# Patient Record
Sex: FEMALE | Race: WHITE | NOT HISPANIC OR LATINO | URBAN - METROPOLITAN AREA
[De-identification: names, ages, dates, MRNs, and addresses within clinical notes are randomized per-mention and may not be internally consistent; named-entity substitution may affect disease eponyms.]

---

## 2023-10-03 ENCOUNTER — EMERGENCY (EMERGENCY)
Facility: HOSPITAL | Age: 23
LOS: 1 days | Discharge: HOME | End: 2023-10-03
Attending: EMERGENCY MEDICINE
Payer: COMMERCIAL

## 2023-10-03 VITALS
HEART RATE: 78 BPM | SYSTOLIC BLOOD PRESSURE: 119 MMHG | WEIGHT: 149.03 LBS | OXYGEN SATURATION: 98 % | TEMPERATURE: 99 F | RESPIRATION RATE: 18 BRPM | DIASTOLIC BLOOD PRESSURE: 77 MMHG

## 2023-10-03 PROCEDURE — 90471 IMMUNIZATION ADMIN: CPT

## 2023-10-03 PROCEDURE — 96372 THER/PROPH/DIAG INJ SC/IM: CPT | Mod: XU

## 2023-10-03 PROCEDURE — 99283 EMERGENCY DEPT VISIT LOW MDM: CPT | Mod: 25

## 2023-10-03 PROCEDURE — 12002 RPR S/N/AX/GEN/TRNK2.6-7.5CM: CPT

## 2023-10-03 PROCEDURE — 90715 TDAP VACCINE 7 YRS/> IM: CPT

## 2023-10-03 PROCEDURE — 99284 EMERGENCY DEPT VISIT MOD MDM: CPT | Mod: 25

## 2023-10-03 RX ORDER — KETOROLAC TROMETHAMINE 30 MG/ML
30 SYRINGE (ML) INJECTION ONCE
Refills: 0 | Status: DISCONTINUED | OUTPATIENT
Start: 2023-10-03 | End: 2023-10-03

## 2023-10-03 RX ORDER — LIDOCAINE HYDROCHLORIDE AND EPINEPHRINE 10; 10 MG/ML; UG/ML
10 INJECTION, SOLUTION INFILTRATION; PERINEURAL ONCE
Refills: 0 | Status: DISCONTINUED | OUTPATIENT
Start: 2023-10-03 | End: 2023-10-07

## 2023-10-03 RX ORDER — TETANUS TOXOID, REDUCED DIPHTHERIA TOXOID AND ACELLULAR PERTUSSIS VACCINE, ADSORBED 5; 2.5; 8; 8; 2.5 [IU]/.5ML; [IU]/.5ML; UG/.5ML; UG/.5ML; UG/.5ML
0.5 SUSPENSION INTRAMUSCULAR ONCE
Refills: 0 | Status: COMPLETED | OUTPATIENT
Start: 2023-10-03 | End: 2023-10-03

## 2023-10-03 RX ADMIN — Medication 30 MILLIGRAM(S): at 23:27

## 2023-10-03 RX ADMIN — TETANUS TOXOID, REDUCED DIPHTHERIA TOXOID AND ACELLULAR PERTUSSIS VACCINE, ADSORBED 0.5 MILLILITER(S): 5; 2.5; 8; 8; 2.5 SUSPENSION INTRAMUSCULAR at 23:27

## 2023-10-03 NOTE — ED ADULT NURSE NOTE - AS PAIN REST
For information on Fall & Injury Prevention, visit [email or web address]
3 (mild pain)

## 2023-10-03 NOTE — ED PROCEDURE NOTE - CPROC ED TIME OUT STATEMENT1
“Patient's name, , procedure and correct site were confirmed during the [place] Timeout.” “Patient's name, , procedure and correct site were confirmed during the [place] Timeout.” “Patient's name, , procedure and correct site were confirmed during the [place] Timeout.”

## 2023-10-03 NOTE — ED ADULT NURSE NOTE - NSFALLUNIVINTERV_ED_ALL_ED
Bed/Stretcher in lowest position, wheels locked, appropriate side rails in place/Call bell, personal items and telephone in reach/Instruct patient to call for assistance before getting out of bed/chair/stretcher/Non-slip footwear applied when patient is off stretcher/[place] to call system/Physically safe environment - no spills, clutter or unnecessary equipment/Purposeful proactive rounding/Room/bathroom lighting operational, light cord in reach Bed/Stretcher in lowest position, wheels locked, appropriate side rails in place/Call bell, personal items and telephone in reach/Instruct patient to call for assistance before getting out of bed/chair/stretcher/Non-slip footwear applied when patient is off stretcher/[place] to call system/Physically safe environment - no spills, clutter or unnecessary equipment/Purposeful proactive rounding/Room/bathroom lighting operational, light cord in reach Bed/Stretcher in lowest position, wheels locked, appropriate side rails in place/Call bell, personal items and telephone in reach/Instruct patient to call for assistance before getting out of bed/chair/stretcher/Non-slip footwear applied when patient is off stretcher/[place] to call system/Physically safe environment - no spills, clutter or unnecessary equipment/Purposeful proactive rounding/Room/bathroom lighting operational, light cord in reach

## 2023-10-03 NOTE — ED PROVIDER NOTE - ATTENDING APP SHARED VISIT CONTRIBUTION OF CARE
22-year-old female patient sustained mechanical fall in playground.  Patient struck back of head on monkey bars and sustained approximately 2.5 cm linear laceration to occipital area.  No LOC, no nausea, no vomiting, no headache.  Patient recalls entire event and is acting appropriately.  GCS 15, no raccoon eyes, no Battles sign, no scalp step off deformities.     Plan- Staple approximation of laceration.  Return precautions explained and questions answered. 22-year-old female patient sustained mechanical fall in playground.  Patient struck back of head on monkey bars and sustained approximately 3.5cm arrow shaped laceration on occipital area.  No LOC, no nausea, no vomiting, no headache.  Patient recalls entire event and is acting appropriately. No signs of TBI.  Pt in not on anticoagulants / antiplatelet agents.   GCS 15, no raccoon eyes, no Battles sign, no scalp step off deformities.     Plan- Staple approximation of laceration.  Return precautions explained and questions answered.

## 2023-10-03 NOTE — ED PROVIDER NOTE - CLINICAL SUMMARY MEDICAL DECISION MAKING FREE TEXT BOX
21 y/o F no PMH presenting to ED For mechanical fall off of monkey bars in playground. States ground was made of rubber material.  +head strike, - LOC, no AC use.  Sustained laceration on occiput. Unk last tdap. Denies other injuries, n/v/d, abd pain, photophobia, numbness, tingling, headache, visual changes. PE notable for 3.5cm on occiput. Neuro intact, GCS 15, do not suspect ICH or acute spinal cord pathology, plan for copious irrigation of wound, analgesia, staple repair. Given wound care instructions, verbalized understanding and need to return to ED or PMD for staple removal. 23 y/o F no PMH presenting to ED For mechanical fall off of monkey bars in playground. States ground was made of rubber material.  +head strike, - LOC, no AC use.  Sustained laceration on occiput. Unk last tdap. Denies other injuries, n/v/d, abd pain, photophobia, numbness, tingling, headache, visual changes. PE notable for 3.5cm on occiput. Neuro intact, GCS 15, do not suspect ICH or acute spinal cord pathology, plan for copious irrigation of wound, analgesia, staple repair. Given wound care instructions, verbalized understanding and need to return to ED or PMD for staple removal.

## 2023-10-03 NOTE — ED PROVIDER NOTE - PHYSICAL EXAMINATION
Gen: NAD, AOx3, able to make needs known, non-toxic  Head: NCAT  HEENT: EOMI, oral mucosa moist, normal conjunctiva  Lung: CTAB, no respiratory distress, no wheezes/rhonchi/rales B/L, speaking in full sentences  CV: RRR, no murmurs  Abd: non distended, soft, nontender, no guarding, no CVA tenderness  MSK: no visible deformities  Neuro: Appears non focal  Skin: 3.5cm arrow shaped laceration on occiput   Psych: normal affect

## 2023-10-03 NOTE — ED PROVIDER NOTE - NS ED ATTENDING STATEMENT MOD
This was a shared visit with the MAY. I reviewed and verified the documentation and independently performed the documented:

## 2023-10-03 NOTE — ED PROVIDER NOTE - NS ED ROS FT
GENERAL: No fever or chills  EYES: No change in vision  HEENT: No trouble swallowing or speaking  CARDIAC: No chest pain  PULMONARY: No cough or SOB  GI: No abdominal pain, no nausea or no vomiting, no diarrhea or constipation  : No changes in urination  SKIN: +laceration  NEURO: No headache, no numbness  MSK: No joint pain  Otherwise as HPI or negative.

## 2023-10-03 NOTE — ED PROVIDER NOTE - NSFOLLOWUPINSTRUCTIONS_ED_ALL_ED_FT
1) Follow up with your doctor as discussed.   2) Return to the ED immediately for new or worsening symptoms like headache, visual changes, numbness,  chest pain.   3) Please continue to take any home medications as prescribed  4) You may take Tylenol and or Motrin for pain  5) Please return to ED or your primary doctor for staple removal in 5 days.    Staple Care    WHAT YOU NEED TO KNOW:    How do I care for my wound?    Clean:  You may be able to shower in 24 hours. Do not soak your wound under water.    Gently wash your wound with soap and warm water daily. Lightly pat it dry. Do not cover your wound unless your healthcare provider tells you to.    You may also need to clean your wound with a mixture of hydrogen peroxide and water. Ask your healthcare provider how to do this.    Do not apply ointment or cream to the wound unless your healthcare provider tells you to.    Elevate:  Rest any arm or leg that has a wound on pillows above the level of your heart. Do this as often as possible for 2 days. This will help decrease swelling and pain.      Minimize scarring:  Avoid sunshine on your wound to reduce scarring.    When should I follow up with my healthcare provider? You may need to return for a wound checkup 3 days after your staples are placed. Ask your healthcare provider when to return to get your staples removed. Your healthcare provider will take your staples out as soon as possible to reduce scarring. Face staples may be removed within 3 to 5 days. Scalp, arm, and leg staples may be removed within 7 to 10 days. Joint, palm, and feet staples may be removed within 10 to 14 days.    How will my staples be removed?    A medical staple remover will be used to take out your staples. Your healthcare provider will slide the tool under each staple, squeeze the handle, and gently pull the staple out.    Medical tape will be placed on your wound once your staples are removed. This will help keep your wound closed. The medical tape will fall off on its own after several days.  When should I contact my healthcare provider?    You have redness, pain, swelling, or pus draining from your wound.    Your pain medicine does not relieve your pain.    You have a fever of 101°F (38.5°C) or higher.    You have an odor coming from your wound.    You have questions or concerns about your condition or care.  When should I seek immediate care?    Your wound reopens.    You have red streaks on your skin that spread out from your wound.    You have severe pain or vomiting.  CARE AGREEMENT:    You have the right to help plan your care. Learn about your health condition and how it may be treated. Discuss treatment options with your healthcare providers to decide what care you want to receive. You always have the right to refuse treatment.

## 2023-10-03 NOTE — ED PROVIDER NOTE - OBJECTIVE STATEMENT
23 y/o F no PMH presenting to ED For mechanical fall off of monkey bars in playground. States ground was made of rubber material.  +head strike, - LOC, no AC use.  Sustained laceration on occiput. Unk last tdap. Denies other injuries, n/v/d, abd pain, photophobia, numbness, tingling, headache, visual changes. 21 y/o F no PMH presenting to ED For mechanical fall off of monkey bars in playground. States ground was made of rubber material.  +head strike, - LOC, no AC use.  Sustained laceration on occiput. Unk last tdap. Denies other injuries, n/v/d, abd pain, photophobia, numbness, tingling, headache, visual changes.

## 2023-10-03 NOTE — ED ADULT NURSE NOTE - NS ED NURSE DISCH DISPOSITION
Reviewed discharge instructions with patient and her family, they verbalized understanding. Patient aware to follow up with her PMD regarding her frequent headaches. She is alert and oriented, no neuro deficit noted, MSP's intact, ambulatory steady and independently, she states she feels okay to go home.       Anita Kate RN  08/05/18 [id number]
Discharged

## 2023-10-03 NOTE — ED PROVIDER NOTE - PATIENT PORTAL LINK FT
You can access the FollowMyHealth Patient Portal offered by [facility] by registering at the following website: [email or web address]. By joining [employer]’s FollowMyHealth portal, you will also be able to view your health information using other applications (apps) compatible with our system. You can access the FollowMyHealth Patient Portal offered by [facility] by registering at the following website: [email or web address]. By joining [employer]’s FollowMyHealth portal, you will also be able to view your health information using other applications (apps) compatible with our system. You can access the FollowMyHealth Patient Portal offered by [facility] by registering at the following website: [email or web address]. By joining [employer]’s FollowMyHealth portal, you will also be able to view your health information using other applications (apps) compatible with our system.

## 2024-03-04 NOTE — ED ADULT NURSE NOTE - IS THE PATIENT ABLE TO BE SCREENED?
[FreeTextEntry1] : Rose is a 14 year old girl with history of anxiety, GERD, Osgood-Schlatter, dysmenorrhea on OCPs, and childhood motor tic disorder diagnosed November 2023. Initially parents deferred starting a medication but tics worsening at Jan followup appointment and she was started guanfacine ER at that time.  Interval Hx: Tics have worsened since last appointment with recent cluster of tics occurring while on family trip to [facility]. Has had vocalizations that started shortly after last follow up appointment. Patient and family believe the guanfacine initially decreased the frequency but that effect has since worn off. Denies any negative impact to her social interactions, bullying. She sees a therapist for general CBT but has not found a psychiatrist yet.  Meds: Guanfacine ER 2mg qD  Hx Reviewed: - Started in early August 2023 when she was at camp, shoulder, now her whole neck/face, several episodes in an hour, then the rest of the day, will have none.  - Happens most days, but frequency varies - She feels premonitory urge - Triggers: cold, stress  SHx: Rose is an honor student, admits stressed/anxious about school. Taking 2 APs this year, biology and seminar. School is "tough" but she is doing well. She admits to feeling anxious, sees SW weekly which she reports has helped a lot. Takes Ashwaganda daily for the last 2 wks at recommendation of therapist (Withania somnifera, known commonly as ashwagandha or winter cherry, is an evergreen shrub in the Solanaceae or nightshade family that grows in [place], [place], and [place] of [place]). Says she feels as though it "calms her." No addl social stressors, school is main stressor.  FH: Paternal cousin (dad not sure if first or second) with tics. Dad reports he has anxiety related to job, not in treatment. No other FH remarkable for mental health/ADHD/tics/Sz. 
Yes
